# Patient Record
Sex: FEMALE | Race: WHITE
[De-identification: names, ages, dates, MRNs, and addresses within clinical notes are randomized per-mention and may not be internally consistent; named-entity substitution may affect disease eponyms.]

---

## 2017-08-29 ENCOUNTER — HOSPITAL ENCOUNTER (OUTPATIENT)
Dept: HOSPITAL 47 - RADMAMWWP | Age: 42
Discharge: HOME | End: 2017-08-29
Payer: COMMERCIAL

## 2017-08-29 DIAGNOSIS — Z12.31: Primary | ICD-10-CM

## 2017-08-29 PROCEDURE — 77063 BREAST TOMOSYNTHESIS BI: CPT

## 2017-08-31 NOTE — MM
Reason for exam: screening  (asymptomatic).

Last mammogram was performed 3 years and 1 month ago.



History:

Family history of breast cancer in grandmother and breast cancer in aunt.

Taking hormonal contraceptives for 18 years.



Physical Findings:

A clinical breast exam by your physician is recommended on an annual basis and 

results should be correlated with mammographic findings.



MG 3D Screening Mammo W/Cad

Bilateral CC and MLO view(s) were taken.

Prior study comparison: August 11, 2014, mammogram, performed at [facility].  July 29, 2013, mammogram, performed at [facility].

The breast tissue is heterogeneously dense. This may lower the sensitivity of 

mammography.  8mm focal asymmetry inferior central left breast 5-6cm from nipple.





ASSESSMENT: Incomplete: need additional imaging evaluation, BI-RAD 0



RECOMMENDATION:

Special view mammogram of the left breast.



If lesion persists on supplemental views, image directed ultrasound is 

recommended.



[facility] will attempt to contact patient to return for supplemental 

views and ultrasound if indicated.

## 2017-09-13 ENCOUNTER — HOSPITAL ENCOUNTER (OUTPATIENT)
Dept: HOSPITAL 47 - RADMAMWWP | Age: 42
Discharge: HOME | End: 2017-09-13
Payer: COMMERCIAL

## 2017-09-13 DIAGNOSIS — R92.8: Primary | ICD-10-CM

## 2017-09-13 DIAGNOSIS — Z80.3: ICD-10-CM

## 2017-09-13 NOTE — MM
Reason for exam: additional evaluation requested from abnormal screening.

Last mammogram was performed less than 1 month ago.



History:

Family history of breast cancer in grandmother and breast cancer in aunt.

Taking hormonal contraceptives for 18 years.



Physical Findings:

Nurse did not find any significant physical abnormalities on exam.



MG Work Up Mamm w CAD LT

Spot compression CC, spot compression MLO, ML, MLO, and CC view(s) were taken of 

the left breast.

Prior study comparison: August 29, 2017, bilateral MG 3d screening mammo w/cad.  

August 11, 2014, mammogram, performed at [facility].

Density in the left breast is less conspicious for which a 6 month follow up is 

recommended.



These results were verbally communicated with the patient and result sheet given 

to the patient on 9/13/17.





ASSESSMENT: Probably benign, BI-RAD 3



RECOMMENDATION:

Follow-up diagnostic mammogram of the left breast in 6 months.

## 2018-04-24 ENCOUNTER — HOSPITAL ENCOUNTER (OUTPATIENT)
Dept: HOSPITAL 47 - RADMAMWWP | Age: 43
Discharge: HOME | End: 2018-04-24
Payer: COMMERCIAL

## 2018-04-24 DIAGNOSIS — R92.8: Primary | ICD-10-CM

## 2018-04-24 PROCEDURE — 77065 DX MAMMO INCL CAD UNI: CPT

## 2018-04-24 NOTE — MM
Reason for exam: follow-up at short interval from prior study.

Last mammogram was performed 7 months ago.



History:

Family history of breast cancer in grandmother and breast cancer in aunt.

Taking hormonal contraceptives for 18 years.



Physical Findings:

Nurse did not find any significant physical abnormalities on exam.



MG 3D Diag Mammo W/Cad LT

CC, MLO, and XCCL view(s) were taken of the left breast.

Prior study comparison: September 13, 2017, left breast MG work up mamm w CAD LT. 

August 29, 2017, bilateral MG 3d screening mammo w/cad.

The breast tissue is heterogeneously dense. This may lower the sensitivity of 

mammography.  There is chronic nodularity in the left breast. There is no dominant

lesion.

No significant new findings when compared with previous films.



These results were verbally communicated with the patient and result sheet given 

to the patient on 4/24/18.





ASSESSMENT: Benign, BI-RAD 2



RECOMMENDATION:

Follow-up diagnostic mammogram of both breasts in 4 months.

Back on schedule for August 2018.

## 2018-06-20 ENCOUNTER — HOSPITAL ENCOUNTER (OUTPATIENT)
Dept: HOSPITAL 47 - LABWHC1 | Age: 43
Discharge: HOME | End: 2018-06-20
Payer: COMMERCIAL

## 2018-06-20 DIAGNOSIS — J30.2: ICD-10-CM

## 2018-06-20 DIAGNOSIS — Z00.00: Primary | ICD-10-CM

## 2018-06-20 DIAGNOSIS — E66.01: ICD-10-CM

## 2018-06-20 DIAGNOSIS — R23.2: ICD-10-CM

## 2018-06-20 LAB
ALBUMIN SERPL-MCNC: 4.1 G/DL (ref 3.5–5)
ALP SERPL-CCNC: 63 U/L (ref 38–126)
ALT SERPL-CCNC: 78 U/L (ref 9–52)
ANION GAP SERPL CALC-SCNC: 12 MMOL/L
AST SERPL-CCNC: 47 U/L (ref 14–36)
BUN SERPL-SCNC: 12 MG/DL (ref 7–17)
CALCIUM SPEC-MCNC: 9.2 MG/DL (ref 8.4–10.2)
CHLORIDE SERPL-SCNC: 105 MMOL/L (ref 98–107)
CHOLEST SERPL-MCNC: 244 MG/DL (ref ?–200)
CO2 SERPL-SCNC: 24 MMOL/L (ref 22–30)
ERYTHROCYTE [DISTWIDTH] IN BLOOD BY AUTOMATED COUNT: 5.21 M/UL (ref 3.8–5.4)
ERYTHROCYTE [DISTWIDTH] IN BLOOD: 13.2 % (ref 11.5–15.5)
GLUCOSE SERPL-MCNC: 93 MG/DL (ref 74–99)
HBA1C MFR BLD: 5.1 % (ref 4–6)
HCT VFR BLD AUTO: 45.3 % (ref 34–46)
HDLC SERPL-MCNC: 46 MG/DL (ref 40–60)
HGB BLD-MCNC: 14.9 GM/DL (ref 11.4–16)
LDLC SERPL CALC-MCNC: 166 MG/DL (ref 0–99)
MCH RBC QN AUTO: 28.5 PG (ref 25–35)
MCHC RBC AUTO-ENTMCNC: 32.8 G/DL (ref 31–37)
MCV RBC AUTO: 87 FL (ref 80–100)
PLATELET # BLD AUTO: 329 K/UL (ref 150–450)
POTASSIUM SERPL-SCNC: 4 MMOL/L (ref 3.5–5.1)
PROT SERPL-MCNC: 7.2 G/DL (ref 6.3–8.2)
SODIUM SERPL-SCNC: 141 MMOL/L (ref 137–145)
T4 FREE SERPL-MCNC: 1.07 NG/DL (ref 0.78–2.19)
TRIGL SERPL-MCNC: 160 MG/DL (ref ?–150)
WBC # BLD AUTO: 6.8 K/UL (ref 3.8–10.6)

## 2018-06-20 PROCEDURE — 84443 ASSAY THYROID STIM HORMONE: CPT

## 2018-06-20 PROCEDURE — 84144 ASSAY OF PROGESTERONE: CPT

## 2018-06-20 PROCEDURE — 84403 ASSAY OF TOTAL TESTOSTERONE: CPT

## 2018-06-20 PROCEDURE — 71046 X-RAY EXAM CHEST 2 VIEWS: CPT

## 2018-06-20 PROCEDURE — 82672 ASSAY OF ESTROGEN: CPT

## 2018-06-20 PROCEDURE — 80061 LIPID PANEL: CPT

## 2018-06-20 PROCEDURE — 84439 ASSAY OF FREE THYROXINE: CPT

## 2018-06-20 PROCEDURE — 36415 COLL VENOUS BLD VENIPUNCTURE: CPT

## 2018-06-20 PROCEDURE — 83036 HEMOGLOBIN GLYCOSYLATED A1C: CPT

## 2018-06-20 PROCEDURE — 83002 ASSAY OF GONADOTROPIN (LH): CPT

## 2018-06-20 PROCEDURE — 80053 COMPREHEN METABOLIC PANEL: CPT

## 2018-06-20 PROCEDURE — 85027 COMPLETE CBC AUTOMATED: CPT

## 2018-06-20 PROCEDURE — 82157 ASSAY OF ANDROSTENEDIONE: CPT

## 2018-06-20 PROCEDURE — 83001 ASSAY OF GONADOTROPIN (FSH): CPT

## 2018-06-20 NOTE — XR
EXAMINATION TYPE: XR chest 2V

 

DATE OF EXAM: 6/20/2018

 

COMPARISON: 12/28/2015

 

TECHNIQUE: PA and lateral views submitted.

 

HISTORY: Shortness of breath

 

FINDINGS:

The lungs are clear and  there is no pneumothorax, pleural effusion, or focal pneumonia.  Biapical pl
eural thickening. No overt failure.

 

IMPRESSION: 

1. No acute process.

## 2018-11-21 ENCOUNTER — HOSPITAL ENCOUNTER (OUTPATIENT)
Dept: HOSPITAL 47 - RADMAMWWP | Age: 43
Discharge: HOME | End: 2018-11-21
Attending: OBSTETRICS & GYNECOLOGY
Payer: COMMERCIAL

## 2018-11-21 DIAGNOSIS — R92.8: Primary | ICD-10-CM

## 2018-11-21 PROCEDURE — 77062 BREAST TOMOSYNTHESIS BI: CPT

## 2018-11-21 PROCEDURE — 77066 DX MAMMO INCL CAD BI: CPT

## 2018-11-21 NOTE — MM
Reason for exam: additional evaluation requested from prior study.

Last mammogram was performed 7 months ago.



History:

Family history of breast cancer in grandmother and breast cancer in aunt.

Taking hormonal contraceptives for 18 years.



Physical Findings:

Nurse did not find any significant physical abnormalities on exam.



MG 3D Diag Mammo W/Cad [facility]

Bilateral CC and MLO view(s) were taken.

Prior study comparison: April 24, 2018, left breast MG 3d diag mammo w/cad LT.  

September 13, 2017, left breast MG work up mamm w CAD LT.

The breast tissue is heterogeneously dense. This may lower the sensitivity of 

mammography.  Inferior central and posterior asymmetric density is unchanged for 1

year.



These results were verbally communicated with the patient and result sheet given 

to the patient on 11/21/18.





ASSESSMENT: Incomplete: need additional imaging evaluation, BI-RAD 0



RECOMMENDATION:

Ultrasound of the left breast.

(inferior half)

## 2018-11-21 NOTE — USB
Reason for exam: additional evaluation requested from abnormal screening.



History:

Family history of breast cancer in grandmother and breast cancer in aunt.

Taking hormonal contraceptives for 18 years.



US Breast Limited LT

Left limited breast ultrasound including focal area of concern, retroareolar and 

axilla demonstrates a 0.6 x 0.2 x 0.5cm mixed lesion at 5 o'clock.



These results were verbally communicated with the patient and result sheet given 

to the patient on 11/21/18.





ASSESSMENT: Probably benign, BI-RAD 3



RECOMMENDATION:

Follow-up diagnostic mammogram and ultrasound of the left breast in 6 months.

## 2019-01-07 ENCOUNTER — HOSPITAL ENCOUNTER (OUTPATIENT)
Dept: HOSPITAL 47 - RADECHMAIN | Age: 44
Discharge: HOME | End: 2019-01-07
Attending: FAMILY MEDICINE
Payer: COMMERCIAL

## 2019-01-07 DIAGNOSIS — R10.11: ICD-10-CM

## 2019-01-07 DIAGNOSIS — I07.1: Primary | ICD-10-CM

## 2019-01-07 PROCEDURE — 76705 ECHO EXAM OF ABDOMEN: CPT

## 2019-01-07 PROCEDURE — 93306 TTE W/DOPPLER COMPLETE: CPT

## 2019-01-07 NOTE — US
EXAMINATION TYPE: US gallbladder

 

DATE OF EXAM: 1/7/2019

 

COMPARISON: CT 2015

 

CLINICAL HISTORY: R19.07 Abd mass, C34.90 Lung Ca. Chest and right shoulder pain x 1 month

 

EXAM MEASUREMENTS:

 

Liver Length:  15.6 cm   

Gallbladder Wall:  0.2 cm   

CBD:  0.4 cm

Right Kidney:  9.9 x 4.5 x 4.1 cm

 

 

 

Pancreas:  obscured by overlying midline bowel gas

Liver:  wnl  

Gallbladder:  wnl

**Evidence for sonographic Sanchez's sign:  no

CBD:  wnl 

Right Kidney:  wnl 

 

There is no ascites.

 

IMPRESSION: Abdominal mass is not evident, CT scan may be of increased sensitivity. Exam is limited.

## 2019-01-08 NOTE — ECHOF
Referral Reason:R07.9 Chest pain



MEASUREMENTS

--------

HEIGHT: 162.6 cm

WEIGHT: 72.6 kg

BP: 116/68

RVIDd:   2.7 cm     (< 3.3)

IVSd:   1.0 cm     (0.6 - 1.1)

LVIDd:   4.3 cm     (3.9 - 5.3)

LVPWd:   0.8 cm     (0.6 - 1.1)

IVSs:   1.3 cm

LVIDs:   2.9 cm

LVPWs:   1.2 cm

LA Diam:   3.0 cm     (2.7 - 3.8)

LAESV Index (A-L):   21.66 ml/m

Ao Diam:   2.9 cm     (2.0 - 3.7)

AV Cusp:   2.1 cm     (1.5 - 2.6)

MV EXCURSION:   13.536 mm     (> 18.000)

MV EF SLOPE:   120 mm/s     (70 - 150)

EPSS:   0.9 cm

MV E Lam:   0.92 m/s

MV DecT:   133 ms

MV A Lam:   0.57 m/s

MV E/A Ratio:   1.63 

RAP:   5.00 mmHg

RVSP:   19.52 mmHg







FINDINGS

--------

Sinus rhythm.

This was a technically excellent study.

The left ventricular size is normal.   Left ventricular wall thickness is normal.   Overall left vent
ricular systolic function is normal with, an EF between 60 - 65 %.

The right ventricle is normal in size.

Normal LA  size by volume 22+/-6 ml/m2.

The right atrium is normal in size.

The aortic valve is trileaflet and appears structurally normal.

The mitral valve is normal.

Mild tricuspid regurgitation present.   Right ventricular systolic pressure is normal at < 35 mmHg.

There is no pulmonic regurgitation present.

The aortic root size is normal.

Normal inferior vena cava with normal inspiratory collapse consistent with estimated right atrial pre
ssure of  5 mmHg.

There is no pericardial effusion.



CONCLUSIONS

--------

1. Sinus rhythm.

2. This was a technically excellent study.

3. The left ventricular size is normal.

4. Left ventricular wall thickness is normal.

5. Overall left ventricular systolic function is normal with, an EF between 60 - 65 %.

6. The right ventricle is normal in size.

7. Normal LA size by volume 22+/-6 ml/m2.

8. The right atrium is normal in size.

9. The aortic valve is trileaflet and appears structurally normal.

10. The mitral valve is normal.

11. Mild tricuspid regurgitation present.

12. Right ventricular systolic pressure is normal at < 35 mmHg.

13. There is no pulmonic regurgitation present.

14. The aortic root size is normal.

15. Normal inferior vena cava with normal inspiratory collapse consistent with estimated right atrial
 pressure of  5 mmHg.

16. There is no pericardial effusion.





SONOGRAPHER: Courtney Srivastava RDCS

## 2019-08-23 ENCOUNTER — HOSPITAL ENCOUNTER (OUTPATIENT)
Dept: HOSPITAL 47 - RADMAMWWP | Age: 44
Discharge: HOME | End: 2019-08-23
Attending: OBSTETRICS & GYNECOLOGY
Payer: COMMERCIAL

## 2019-08-23 DIAGNOSIS — R92.8: Primary | ICD-10-CM

## 2019-08-23 PROCEDURE — 77061 BREAST TOMOSYNTHESIS UNI: CPT

## 2019-08-23 PROCEDURE — 77065 DX MAMMO INCL CAD UNI: CPT

## 2019-08-26 NOTE — MM
Reason for exam: follow-up at short interval from prior study.

Last mammogram was performed 9 months ago.



History:

Family history of breast cancer in paternal grandmother at age 50 and breast 

cancer in maternal aunt at age 50.

Taking hormonal contraceptives for 19 years.



Physical Findings:

Nurse did not find any significant physical abnormalities on exam.



MG 3D Diag Mammo W/Cad LT

CC and MLO view(s) were taken of the left breast.

Prior study comparison: November 21, 2018, bilateral MG 3d diag mammo w/cad [facility].  

April 24, 2018, left breast MG 3d diag mammo w/cad LT.

The breast tissue is heterogeneously dense. This may lower the sensitivity of 

mammography.  There is no discrete abnormality.

No significant new findings when compared with previous films.



These results were verbally communicated with the patient and result sheet given 

to the patient on 8/23/19.





ASSESSMENT: Benign, BI-RAD 2



RECOMMENDATION:

Routine screening mammogram of both breasts in 3 months.

Back on schedule for November 2019.

## 2019-08-26 NOTE — USB
Reason for exam: follow-up at short interval from prior study.



History:

Family history of breast cancer in paternal grandmother at age 50 and breast 

cancer in maternal aunt at age 50.

Taking hormonal contraceptives for 19 years.



US Breast Limited LT

Left limited breast ultrasound including focal area of concern, retroareolar and 

axilla demonstrates a 8 x 3 x 5mm oval, hypoechoic, stable lesion at 5 o'clock.



These results were verbally communicated with the patient and result sheet given 

to the patient on 8/23/19.





ASSESSMENT: Benign, BI-RAD 2



RECOMMENDATION:

Routine screening mammogram of both breasts in 3 months.

Back on schedule for November 2019.

## 2019-09-16 ENCOUNTER — HOSPITAL ENCOUNTER (EMERGENCY)
Dept: HOSPITAL 47 - EC | Age: 44
Discharge: HOME | End: 2019-09-16
Payer: COMMERCIAL

## 2019-09-16 VITALS — TEMPERATURE: 98 F

## 2019-09-16 VITALS — SYSTOLIC BLOOD PRESSURE: 133 MMHG | DIASTOLIC BLOOD PRESSURE: 71 MMHG | RESPIRATION RATE: 18 BRPM | HEART RATE: 77 BPM

## 2019-09-16 DIAGNOSIS — Z79.3: ICD-10-CM

## 2019-09-16 DIAGNOSIS — Z79.899: ICD-10-CM

## 2019-09-16 DIAGNOSIS — Z91.018: ICD-10-CM

## 2019-09-16 DIAGNOSIS — N12: Primary | ICD-10-CM

## 2019-09-16 DIAGNOSIS — Z88.1: ICD-10-CM

## 2019-09-16 DIAGNOSIS — Z91.048: ICD-10-CM

## 2019-09-16 LAB
ALBUMIN SERPL-MCNC: 4.1 G/DL (ref 3.5–5)
ALP SERPL-CCNC: 74 U/L (ref 38–126)
ALT SERPL-CCNC: 40 U/L (ref 9–52)
ANION GAP SERPL CALC-SCNC: 10 MMOL/L
AST SERPL-CCNC: 26 U/L (ref 14–36)
BUN SERPL-SCNC: 11 MG/DL (ref 7–17)
CALCIUM SPEC-MCNC: 9 MG/DL (ref 8.4–10.2)
CELLS COUNTED: 100
CHLORIDE SERPL-SCNC: 102 MMOL/L (ref 98–107)
CO2 SERPL-SCNC: 25 MMOL/L (ref 22–30)
EOSINOPHIL # BLD MANUAL: 0.09 K/UL (ref 0–0.7)
ERYTHROCYTE [DISTWIDTH] IN BLOOD BY AUTOMATED COUNT: 5.07 M/UL (ref 3.8–5.4)
ERYTHROCYTE [DISTWIDTH] IN BLOOD: 15.5 % (ref 11.5–15.5)
GLUCOSE SERPL-MCNC: 138 MG/DL (ref 74–99)
HCT VFR BLD AUTO: 44.5 % (ref 34–46)
HGB BLD-MCNC: 15 GM/DL (ref 11.4–16)
LYMPHOCYTES # BLD MANUAL: 2.49 K/UL (ref 1–4.8)
MCH RBC QN AUTO: 29.6 PG (ref 25–35)
MCHC RBC AUTO-ENTMCNC: 33.8 G/DL (ref 31–37)
MCV RBC AUTO: 87.6 FL (ref 80–100)
MONOCYTES # BLD MANUAL: 0.43 K/UL (ref 0–1)
NEUTROPHILS NFR BLD MANUAL: 65 %
PH UR: 5.5 [PH] (ref 5–8)
PLATELET # BLD AUTO: 341 K/UL (ref 150–450)
POTASSIUM SERPL-SCNC: 3.9 MMOL/L (ref 3.5–5.1)
PROT SERPL-MCNC: 7.3 G/DL (ref 6.3–8.2)
PROT UR QL: (no result)
RBC UR QL: 78 /HPF (ref 0–5)
SODIUM SERPL-SCNC: 137 MMOL/L (ref 137–145)
SP GR UR: 1.02 (ref 1–1.03)
SQUAMOUS UR QL AUTO: 27 /HPF (ref 0–4)
UROBILINOGEN UR QL STRIP: <2 MG/DL (ref ?–2)
WBC # BLD AUTO: 8.6 K/UL (ref 3.8–10.6)

## 2019-09-16 PROCEDURE — 96374 THER/PROPH/DIAG INJ IV PUSH: CPT

## 2019-09-16 PROCEDURE — 87086 URINE CULTURE/COLONY COUNT: CPT

## 2019-09-16 PROCEDURE — 81025 URINE PREGNANCY TEST: CPT

## 2019-09-16 PROCEDURE — 99283 EMERGENCY DEPT VISIT LOW MDM: CPT

## 2019-09-16 PROCEDURE — 96375 TX/PRO/DX INJ NEW DRUG ADDON: CPT

## 2019-09-16 PROCEDURE — 85025 COMPLETE CBC W/AUTO DIFF WBC: CPT

## 2019-09-16 PROCEDURE — 36415 COLL VENOUS BLD VENIPUNCTURE: CPT

## 2019-09-16 PROCEDURE — 80053 COMPREHEN METABOLIC PANEL: CPT

## 2019-09-16 PROCEDURE — 81001 URINALYSIS AUTO W/SCOPE: CPT

## 2019-09-16 NOTE — ED
Back Pain HPI





- General


Chief Complaint: Back Pain/Injury


Stated Complaint: Lower Back and Groin Pain


Time Seen by Provider: 09/16/19 02:47


Source: patient


Limitations: no limitations





- History of Present Illness


Initial Comments: 





Patient is a 43-year-old female presenting to emergency Department with a chief 

complaint of kidney and groin pain.  Patient reports she developed a left flank 

pain Yesterday along with left groin pain that is exacerbated with ambulation.  

Patient reports the flank pain is exacerbated with urination.  Patient denies 

increased urgency or frequency or dysuria.  Patient denies any other abdominal 

pain.  Patient denies hematuria, hematochezia or melena.  Patient does report 

chills but no fevers.  Patient has no history of kidney stones.  Patient denies 

any vaginal bleeding or discharge.  Patient denies previous abdominal surgeries.





- Related Data


                                Home Medications











 Medication  Instructions  Recorded  Confirmed


 


Cetirizine HCl [Zyrtec] 10 mg PO DAILY 04/25/15 12/28/15


 


Seasoniq Lo 1 tab PO DAILY 04/25/15 12/28/15


 


diphenhydrAMINE [Benadryl] 25 mg PO BID PRN 04/25/15 12/28/15








                                  Previous Rx's











 Medication  Instructions  Recorded


 


Levofloxacin [Levaquin] 750 mg PO DAILY #7 tab 12/28/15


 


Ondansetron Odt [Zofran ODT] 4 mg PO Q8HR PRN #10 tab 12/28/15


 


Nitrofurantoin Monohyd/M-Cryst 100 mg PO Q12HR #14 cap 09/16/19





[Macrobid]  











                                    Allergies











Allergy/AdvReac Type Severity Reaction Status Date / Time


 


cephalexin monohydrate Allergy  Unknown Verified 12/28/15 01:27





[From Keflex]     


 


clindamycin Allergy  Unknown Verified 09/16/19 02:45


 


multiple food allergies Allergy  Anaphylaxis Uncoded 09/16/19 02:45


 


seasonal allergies Allergy  Unknown Uncoded 09/16/19 02:45














Review of Systems


ROS Statement: 


Those systems with pertinent positive or pertinent negative responses have been 

documented in the HPI.





ROS Other: All systems not noted in ROS Statement are negative.





Past Medical History


Past Medical History: No Reported History


History of Any Multi-Drug Resistant Organisms: None Reported, C-DIFF


Date of last positivie culture/infection: 2015


Past Surgical History: No Surgical Hx Reported


Past Psychological History: Anxiety


Smoking Status: Never smoker


Past Alcohol Use History: Occasional


Past Drug Use History: None Reported





General Exam


Limitations: no limitations


General appearance: alert, in no apparent distress


Head exam: Present: atraumatic, normocephalic, normal inspection


Eye exam: Present: normal appearance, PERRL, EOMI


Pupils: Present: normal accommodation


ENT exam: Present: normal exam, mucous membranes moist, normal external ear exam


Neck exam: Present: normal inspection, full ROM.  Absent: tenderness


Respiratory exam: Present: normal lung sounds bilaterally


Cardiovascular Exam: Present: regular rate, normal rhythm, normal heart sounds


GI/Abdominal exam: Present: soft, tenderness (Left groin and suprapubic 

tenderness on palpation.  Left flank pain.), normal bowel sounds.  Absent: 

distended, guarding, rebound, other (Negative Sanchez, negative McBurney point 

tenderness, negative Rovsing, negative obturator.)


Extremities exam: Present: normal inspection, full ROM


Back exam: Present: normal inspection, full ROM, CVA tenderness (L)


Neurological exam: Present: alert, oriented X3


Psychiatric exam: Present: normal affect, normal mood


Skin exam: Present: warm, intact, normal color





Course


                                   Vital Signs











  09/16/19 09/16/19





  02:43 03:45


 


Temperature 98.1 F 98 F


 


Pulse Rate 111 H 69


 


Respiratory 20 16





Rate  


 


Blood Pressure 124/88 105/72


 


O2 Sat by Pulse 95 97





Oximetry  














Medical Decision Making





- Medical Decision Making





patient is a 43-year-old female presenting to the emergency department with a 

chief complaint of flank pain.  Patient reports left flank pain and groin pain 

has started today.  Patient also has left flank pain with urination.  Otherwise 

no increased urgency or frequency or dysuria.  The ground pain appears to be 

musculoskeletal in nature as it is exacerbated with hip extension.  No hernia 

noted on physical examination.  UA is indicative of elevation in white blood 

cells and leukocyte esterase.  Patient is concerned for susceptibility to C. 

diff.  She will be treated with Macrobid.  Patient given a dose in the ED.  

Patient will be discharged with Zofran and Macrobid.  Patient is nontoxic at the

moment.  Vitals are stable.  Strict return parameters were thoroughly discussed 

patient was understanding and agreeable.  No need for further imaging required. 

Case discussed with physician.





- Lab Data


Result diagrams: 


                                 09/16/19 03:12





                                 09/16/19 03:12


                                   Lab Results











  09/16/19 09/16/19 09/16/19 Range/Units





  02:47 02:47 03:12 


 


WBC    8.6  (3.8-10.6)  k/uL


 


RBC    5.07  (3.80-5.40)  m/uL


 


Hgb    15.0  (11.4-16.0)  gm/dL


 


Hct    44.5  (34.0-46.0)  %


 


MCV    87.6  (80.0-100.0)  fL


 


MCH    29.6  (25.0-35.0)  pg


 


MCHC    33.8  (31.0-37.0)  g/dL


 


RDW    15.5  (11.5-15.5)  %


 


Plt Count    341  (150-450)  k/uL


 


Neutrophils % (Manual)    65  %


 


Lymphocytes % (Manual)    29  %


 


Monocytes % (Manual)    5  %


 


Eosinophils % (Manual)    1  %


 


Neutrophils # (Manual)    5.59  (1.3-7.7)  k/uL


 


Lymphocytes # (Manual)    2.49  (1.0-4.8)  k/uL


 


Monocytes # (Manual)    0.43  (0-1.0)  k/uL


 


Eosinophils # (Manual)    0.09  (0-0.7)  k/uL


 


Nucleated RBCs    0  (0-0)  /100 WBC


 


Manual Slide Review    Performed  


 


Sodium     (137-145)  mmol/L


 


Potassium     (3.5-5.1)  mmol/L


 


Chloride     ()  mmol/L


 


Carbon Dioxide     (22-30)  mmol/L


 


Anion Gap     mmol/L


 


BUN     (7-17)  mg/dL


 


Creatinine     (0.52-1.04)  mg/dL


 


Est GFR (CKD-EPI)AfAm     (>60 ml/min/1.73 sqM)  


 


Est GFR (CKD-EPI)NonAf     (>60 ml/min/1.73 sqM)  


 


Glucose     (74-99)  mg/dL


 


Calcium     (8.4-10.2)  mg/dL


 


Total Bilirubin     (0.2-1.3)  mg/dL


 


AST     (14-36)  U/L


 


ALT     (9-52)  U/L


 


Alkaline Phosphatase     ()  U/L


 


Total Protein     (6.3-8.2)  g/dL


 


Albumin     (3.5-5.0)  g/dL


 


Urine Color  Yellow    


 


Urine Appearance  Turbid H    (Clear)  


 


Urine pH  5.5    (5.0-8.0)  


 


Ur Specific Gravity  1.024    (1.001-1.035)  


 


Urine Protein  1+ H    (Negative)  


 


Urine Glucose (UA)  Negative    (Negative)  


 


Urine Ketones  Negative    (Negative)  


 


Urine Blood  Trace H    (Negative)  


 


Urine Nitrite  Negative    (Negative)  


 


Urine Bilirubin  Negative    (Negative)  


 


Urine Urobilinogen  <2.0    (<2.0)  mg/dL


 


Ur Leukocyte Esterase  Large H    (Negative)  


 


Urine RBC  78 H    (0-5)  /hpf


 


Urine WBC  >182 H    (0-5)  /hpf


 


Urine WBC Clumps  Moderate H    (None)  /hpf


 


Ur Squamous Epith Cells  27 H    (0-4)  /hpf


 


Urine Bacteria  Moderate H    (None)  /hpf


 


Urine Mucus  Many H    (None)  /hpf


 


Urine HCG, Qual   Not Detected   (Not Detectd)  














  09/16/19 Range/Units





  03:12 


 


WBC   (3.8-10.6)  k/uL


 


RBC   (3.80-5.40)  m/uL


 


Hgb   (11.4-16.0)  gm/dL


 


Hct   (34.0-46.0)  %


 


MCV   (80.0-100.0)  fL


 


MCH   (25.0-35.0)  pg


 


MCHC   (31.0-37.0)  g/dL


 


RDW   (11.5-15.5)  %


 


Plt Count   (150-450)  k/uL


 


Neutrophils % (Manual)   %


 


Lymphocytes % (Manual)   %


 


Monocytes % (Manual)   %


 


Eosinophils % (Manual)   %


 


Neutrophils # (Manual)   (1.3-7.7)  k/uL


 


Lymphocytes # (Manual)   (1.0-4.8)  k/uL


 


Monocytes # (Manual)   (0-1.0)  k/uL


 


Eosinophils # (Manual)   (0-0.7)  k/uL


 


Nucleated RBCs   (0-0)  /100 WBC


 


Manual Slide Review   


 


Sodium  137  (137-145)  mmol/L


 


Potassium  3.9  (3.5-5.1)  mmol/L


 


Chloride  102  ()  mmol/L


 


Carbon Dioxide  25  (22-30)  mmol/L


 


Anion Gap  10  mmol/L


 


BUN  11  (7-17)  mg/dL


 


Creatinine  0.96  (0.52-1.04)  mg/dL


 


Est GFR (CKD-EPI)AfAm  84  (>60 ml/min/1.73 sqM)  


 


Est GFR (CKD-EPI)NonAf  73  (>60 ml/min/1.73 sqM)  


 


Glucose  138 H  (74-99)  mg/dL


 


Calcium  9.0  (8.4-10.2)  mg/dL


 


Total Bilirubin  0.7  (0.2-1.3)  mg/dL


 


AST  26  (14-36)  U/L


 


ALT  40  (9-52)  U/L


 


Alkaline Phosphatase  74  ()  U/L


 


Total Protein  7.3  (6.3-8.2)  g/dL


 


Albumin  4.1  (3.5-5.0)  g/dL


 


Urine Color   


 


Urine Appearance   (Clear)  


 


Urine pH   (5.0-8.0)  


 


Ur Specific Gravity   (1.001-1.035)  


 


Urine Protein   (Negative)  


 


Urine Glucose (UA)   (Negative)  


 


Urine Ketones   (Negative)  


 


Urine Blood   (Negative)  


 


Urine Nitrite   (Negative)  


 


Urine Bilirubin   (Negative)  


 


Urine Urobilinogen   (<2.0)  mg/dL


 


Ur Leukocyte Esterase   (Negative)  


 


Urine RBC   (0-5)  /hpf


 


Urine WBC   (0-5)  /hpf


 


Urine WBC Clumps   (None)  /hpf


 


Ur Squamous Epith Cells   (0-4)  /hpf


 


Urine Bacteria   (None)  /hpf


 


Urine Mucus   (None)  /hpf


 


Urine HCG, Qual   (Not Detectd)  














Disposition


Clinical Impression: 


 Pyelonephritis





Disposition: HOME SELF-CARE


Condition: Stable


Instructions (If sedation given, give patient instructions):  Kidney Infection 

(ED)


Additional Instructions: 


Please take prescribed medication as directed.  Please follow up with primary 

care.  Alternate between Tylenol and ibuprofen for pain control.  Please return 

to emergency department if symptoms worsen.


Prescriptions: 


Nitrofurantoin Monohyd/M-Cryst [Macrobid] 100 mg PO Q12HR #14 cap


Is patient prescribed a controlled substance at d/c from ED?: No


Referrals: 


Mario Juárez DO [Primary Care Provider] - 1-2 days


Time of Disposition: 04:29

## 2020-06-18 ENCOUNTER — HOSPITAL ENCOUNTER (OUTPATIENT)
Dept: HOSPITAL 47 - RADMAMWWP | Age: 45
Discharge: HOME | End: 2020-06-18
Attending: OBSTETRICS & GYNECOLOGY
Payer: COMMERCIAL

## 2020-06-18 DIAGNOSIS — Z12.31: Primary | ICD-10-CM

## 2020-06-18 PROCEDURE — 77067 SCR MAMMO BI INCL CAD: CPT

## 2020-06-18 PROCEDURE — 77063 BREAST TOMOSYNTHESIS BI: CPT

## 2020-06-22 NOTE — MM
Reason for exam: screening  (asymptomatic).

Last mammogram was performed 10 months ago.



History:

Family history of breast cancer in paternal grandmother at age 50 and breast 

cancer in maternal aunt at age 50.

Taking hormonal contraceptives for 19 years.



Physical Findings:

A clinical breast exam by your physician is recommended on an annual basis and 

results should be correlated with mammographic findings.



MG 3D Screening Mammo W/Cad

Bilateral CC and MLO view(s) were taken.

Prior study comparison: August 23, 2019, left breast MG 3d diag mammo w/cad LT.  

November 21, 2018, bilateral MG 3d diag mammo w/cad [facility].

The breast tissue is heterogeneously dense. This may lower the sensitivity of 

mammography.  There is no discrete abnormality.





ASSESSMENT: Negative, BI-RAD 1



RECOMMENDATION:

Routine screening mammogram of both breasts in 1 year.

## 2022-02-09 ENCOUNTER — HOSPITAL ENCOUNTER (OUTPATIENT)
Dept: HOSPITAL 47 - RADMAMWWP | Age: 47
Discharge: HOME | End: 2022-02-09
Attending: OBSTETRICS & GYNECOLOGY
Payer: COMMERCIAL

## 2022-02-09 DIAGNOSIS — Z80.3: ICD-10-CM

## 2022-02-09 DIAGNOSIS — Z12.31: Primary | ICD-10-CM

## 2022-02-09 PROCEDURE — 77067 SCR MAMMO BI INCL CAD: CPT

## 2022-02-09 PROCEDURE — 77063 BREAST TOMOSYNTHESIS BI: CPT

## 2022-02-11 NOTE — MM
Reason for exam: screening  (asymptomatic).

Last mammogram was performed 1 year and 8 months ago.



History:

Family history of breast cancer in paternal grandmother at age 50 and breast 

cancer in maternal aunt at age 50.

Taking hormonal contraceptives for 19 years.



Physical Findings:

A clinical breast exam by your physician is recommended on an annual basis and 

results should be correlated with mammographic findings.



MG 3D Screening Mammo W/Cad

Bilateral CC and MLO view(s) were taken.

Prior study comparison: June 18, 2020, bilateral MG 3d screening mammo w/cad.  

August 23, 2019, left breast MG 3d diag mammo w/cad LT.

The breast tissue is heterogeneously dense. This may lower the sensitivity of 

mammography.  Asymmetry inferior left MLO is unchanged.  No significant changes 

when compared with prior studies.





ASSESSMENT: Benign, BI-RAD 2



RECOMMENDATION:

Routine screening mammogram of both breasts in 1 year.

## 2022-09-12 ENCOUNTER — HOSPITAL ENCOUNTER (OUTPATIENT)
Dept: HOSPITAL 47 - LABWHC1 | Age: 47
Discharge: HOME | End: 2022-09-12
Attending: FAMILY MEDICINE
Payer: COMMERCIAL

## 2022-09-12 DIAGNOSIS — Z00.00: Primary | ICD-10-CM

## 2022-09-12 DIAGNOSIS — M79.10: ICD-10-CM

## 2022-09-12 DIAGNOSIS — E55.9: ICD-10-CM

## 2022-09-12 LAB
ALBUMIN SERPL-MCNC: 4.2 G/DL (ref 3.8–4.9)
ALBUMIN/GLOB SERPL: 1.56 G/DL (ref 1.6–3.17)
ALP SERPL-CCNC: 62 U/L (ref 41–126)
ALT SERPL-CCNC: 33 U/L (ref 8–44)
ANION GAP SERPL CALC-SCNC: 11.2 MMOL/L (ref 10–18)
AST SERPL-CCNC: 23 U/L (ref 13–35)
BUN SERPL-SCNC: 15.2 MG/DL (ref 9–27)
BUN/CREAT SERPL: 15.2 RATIO (ref 12–20)
CALCIUM SPEC-MCNC: 8.9 MG/DL (ref 8.7–10.3)
CHLORIDE SERPL-SCNC: 103 MMOL/L (ref 96–109)
CHOLEST SERPL-MCNC: 221 MG/DL (ref 0–200)
CO2 SERPL-SCNC: 22.8 MMOL/L (ref 20–27.5)
DSDNA AB SER QL: NEGATIVE
DSDNA AB TITR SER: 1 IU/ML
ERYTHROCYTE [DISTWIDTH] IN BLOOD BY AUTOMATED COUNT: 5.01 X 10*6/UL (ref 4.1–5.2)
ERYTHROCYTE [DISTWIDTH] IN BLOOD: 12.8 % (ref 11.5–14.5)
GLOBULIN SER CALC-MCNC: 2.7 G/DL (ref 1.6–3.3)
GLUCOSE SERPL-MCNC: 92 MG/DL (ref 70–110)
HCT VFR BLD AUTO: 44.8 % (ref 37.2–46.3)
HDLC SERPL-MCNC: 38.3 MG/DL (ref 40–60)
HGB BLD-MCNC: 14.4 G/DL (ref 12–15)
LDLC SERPL CALC-MCNC: 155.9 MG/DL (ref 0–131)
MCH RBC QN AUTO: 28.7 PG (ref 27–32)
MCHC RBC AUTO-ENTMCNC: 32.1 G/DL (ref 32–37)
MCV RBC AUTO: 89.4 FL (ref 80–97)
NRBC BLD AUTO-RTO: 0 /100 WBCS (ref 0–0)
PLATELET # BLD AUTO: 348 X 10*3/UL (ref 140–440)
POTASSIUM SERPL-SCNC: 4.4 MMOL/L (ref 3.5–5.5)
PROT SERPL-MCNC: 6.9 G/DL (ref 6.2–8.2)
RHEUMATOID FACT SERPL-ACNC: <10 IU/ML (ref 0–15)
SODIUM SERPL-SCNC: 137 MMOL/L (ref 135–145)
T4 FREE SERPL-MCNC: 1.17 NG/DL (ref 0.8–1.8)
TRIGL SERPL-MCNC: 134 MG/DL (ref 0–149)
VLDLC SERPL CALC-MCNC: 26.8 MG/DL (ref 5–40)
WBC # BLD AUTO: 8.5 X 10*3/UL (ref 4.5–10)

## 2022-09-12 PROCEDURE — 86431 RHEUMATOID FACTOR QUANT: CPT

## 2022-09-12 PROCEDURE — 85027 COMPLETE CBC AUTOMATED: CPT

## 2022-09-12 PROCEDURE — 84443 ASSAY THYROID STIM HORMONE: CPT

## 2022-09-12 PROCEDURE — 83036 HEMOGLOBIN GLYCOSYLATED A1C: CPT

## 2022-09-12 PROCEDURE — 86812 HLA TYPING A B OR C: CPT

## 2022-09-12 PROCEDURE — 84439 ASSAY OF FREE THYROXINE: CPT

## 2022-09-12 PROCEDURE — 86038 ANTINUCLEAR ANTIBODIES: CPT

## 2022-09-12 PROCEDURE — 82306 VITAMIN D 25 HYDROXY: CPT

## 2022-09-12 PROCEDURE — 36415 COLL VENOUS BLD VENIPUNCTURE: CPT

## 2022-09-12 PROCEDURE — 80061 LIPID PANEL: CPT

## 2022-09-12 PROCEDURE — 80053 COMPREHEN METABOLIC PANEL: CPT

## 2022-09-12 PROCEDURE — 86225 DNA ANTIBODY NATIVE: CPT

## 2022-09-12 PROCEDURE — 85652 RBC SED RATE AUTOMATED: CPT

## 2022-09-13 LAB — HLA-B27 QL: NEGATIVE

## 2023-04-03 ENCOUNTER — HOSPITAL ENCOUNTER (OUTPATIENT)
Dept: HOSPITAL 47 - RADMAMWWP | Age: 48
Discharge: HOME | End: 2023-04-03
Attending: OBSTETRICS & GYNECOLOGY
Payer: COMMERCIAL

## 2023-04-03 DIAGNOSIS — Z12.31: Primary | ICD-10-CM

## 2023-04-03 DIAGNOSIS — Z80.3: ICD-10-CM

## 2023-04-03 PROCEDURE — 77063 BREAST TOMOSYNTHESIS BI: CPT

## 2023-04-03 PROCEDURE — 77067 SCR MAMMO BI INCL CAD: CPT

## 2023-04-04 NOTE — MM
Reason for Exam: Screening  (asymptomatic). 

Last mammogram was performed 1 year(s) and 2 month(s) ago. 





Patient History: 

Menarche at age 13. First Full-Term Pregnancy at age 20. Currently using Hormonal Contraceptives,

for 19 years.

Paternal grandmother had breast cancer, age 50. Maternal aunt had breast cancer, age 50. Maternal

grandmother had breast cancer.

Last menstrual period: 03/20/2023





Risk Values: 

Rosina 5 year model risk: 0.6%.

NCI Lifetime model risk: 6.9%.





Prior Study Comparison: 

8/23/2019 Left Diagnostic Mammogram, [facility]. 6/18/2020 Bilateral Screening Mammogram, [facility]. 2/9/2022

Bilateral Screening Mammogram, [facility]. 





Tissue Density: 

The breast tissue is heterogeneously dense. This may lower the sensitivity of mammography.





Findings: 

Analyzed By CAD. 

There is no suspicious group of microcalcifications or new suspicious mass in either breast. 





Overall Assessment: Negative, BI-RAD 1





Management: 

Screening Mammogram of both breasts in 1 year.

A clinical breast exam by your physician is recommended on an annual basis and results should be

correlated with mammographic findings.



Electronically signed and approved by: Kavin Humphries D.O.

## 2024-09-13 ENCOUNTER — HOSPITAL ENCOUNTER (OUTPATIENT)
Dept: HOSPITAL 47 - RADMAMWWP | Age: 49
Discharge: HOME | End: 2024-09-13
Attending: OBSTETRICS & GYNECOLOGY
Payer: COMMERCIAL

## 2024-09-13 PROCEDURE — 77063 BREAST TOMOSYNTHESIS BI: CPT

## 2024-09-13 PROCEDURE — 77067 SCR MAMMO BI INCL CAD: CPT

## 2024-09-17 NOTE — MM
Reason for Exam: Screening  (asymptomatic). 

Last mammogram was performed 1 year(s) and 5 month(s) ago. 





Patient History: 

Menarche at age 13. First Full-Term Pregnancy at age 20. Premenopausal. Currently using Hormonal

Contraceptives, for 19 years.

Paternal grandmother had breast cancer, age 50. Maternal aunt had breast cancer, age 50. Maternal

grandmother had breast cancer. Maternal aunt had breast cancer, age 50. 





Risk Values: 

Rosina 5 year model risk: 0.6%.

NCI Lifetime model risk: 6.8%.





Prior Study Comparison: 

6/18/2020 Bilateral Screening Mammogram, [facility]. 2/9/2022 Bilateral Screening Mammogram, [facility]. 4/3/2023

Bilateral MG 3D screening mammo w/cad, [facility]. 





Tissue Density: 

The breasts are heterogeneously dense, which may obscure small masses.





Findings: 

Analyzed By CAD. 

The pattern is symmetrical.

No significant interval change

No suspicious groups of microcalcifications, spiculated or lobular masses, architectural distortion

or other secondary signs of malignancy are mammographically apparent. 





Overall Assessment: Benign, BI-RAD 2





Management: 

Screening Mammogram of both breasts in 1 year.

A negative mammogram report should not preclude additional follow up of suspicious palpable

abnormalities.

Patient should continue monthly self breast exam.

A clinical breast exam by your physician is recommended on an annual basis and results should be

correlated with mammographic findings.



Note on Rosina scores and lifetime risk:

1. A Rosina score greater than 3% is considered moderate risk. If this is the case, consider

specialist referral to assess eligibility for a risk reducing agent.

2. If overall lifetime risk for the development of breast cancer is 20% or higher, the patient may

qualify for future screening with alternating mammogram and breast MRI.



[facility], Workstation: [id number], 9/17/2024 3:58 PM.



Electronically signed and approved by: Johnathan Peres D.O. Radiologis

## 2024-10-11 ENCOUNTER — HOSPITAL ENCOUNTER (OUTPATIENT)
Dept: HOSPITAL 47 - LABWHC1 | Age: 49
Discharge: HOME | End: 2024-10-11
Attending: FAMILY MEDICINE
Payer: COMMERCIAL

## 2024-10-11 LAB
ALBUMIN SERPL-MCNC: 4.3 G/DL (ref 3.8–4.9)
ALBUMIN/GLOB SERPL: 1.43 RATIO (ref 1.6–3.17)
ALP SERPL-CCNC: 65 U/L (ref 41–126)
ALT SERPL-CCNC: 27 U/L (ref 8–44)
ANION GAP SERPL CALC-SCNC: 11.6 MMOL/L (ref 4–12)
AST SERPL-CCNC: 21 U/L (ref 13–35)
BASOPHILS # BLD AUTO: 0.04 X 10*3/UL (ref 0–0.1)
BASOPHILS NFR BLD AUTO: 0.4 %
BUN SERPL-SCNC: 15.2 MG/DL (ref 9–27)
BUN/CREAT SERPL: 16.89 RATIO (ref 12–20)
CALCIUM SPEC-MCNC: 9.2 MG/DL (ref 8.7–10.3)
CHLORIDE SERPL-SCNC: 106 MMOL/L (ref 96–109)
CHOLEST SERPL-MCNC: 248 MG/DL (ref 0–200)
CO2 SERPL-SCNC: 23.4 MMOL/L (ref 21.6–31.8)
EOSINOPHIL # BLD AUTO: 0.12 X 10*3/UL (ref 0.04–0.35)
EOSINOPHIL NFR BLD AUTO: 1.2 %
ERYTHROCYTE [DISTWIDTH] IN BLOOD BY AUTOMATED COUNT: 5.09 X 10*6/UL (ref 4.1–5.2)
ERYTHROCYTE [DISTWIDTH] IN BLOOD: 12.6 % (ref 11.5–14.5)
GLOBULIN SER CALC-MCNC: 3 G/DL (ref 1.6–3.3)
GLUCOSE SERPL-MCNC: 82 MG/DL (ref 70–110)
HCT VFR BLD AUTO: 46.7 % (ref 37.2–46.3)
HDLC SERPL-MCNC: 43.2 MG/DL (ref 40–60)
HGB BLD-MCNC: 15.2 G/DL (ref 12–15)
IMM GRANULOCYTES BLD QL AUTO: 0.2 %
LDLC SERPL CALC-MCNC: 180.2 MG/DL (ref 0–131)
LYMPHOCYTES # SPEC AUTO: 2.68 X 10*3/UL (ref 0.9–5)
LYMPHOCYTES NFR SPEC AUTO: 26.7 %
MCH RBC QN AUTO: 29.9 PG (ref 27–32)
MCHC RBC AUTO-ENTMCNC: 32.5 G/DL (ref 32–37)
MCV RBC AUTO: 91.7 FL (ref 80–97)
MONOCYTES # BLD AUTO: 0.45 X 10*3/UL (ref 0.2–1)
MONOCYTES NFR BLD AUTO: 4.5 %
NEUTROPHILS # BLD AUTO: 6.73 X 10*3/UL (ref 1.8–7.7)
NEUTROPHILS NFR BLD AUTO: 67 %
NRBC BLD AUTO-RTO: 0 X 10*3/UL (ref 0–0.01)
PLATELET # BLD AUTO: 390 X 10*3/UL (ref 140–440)
POTASSIUM SERPL-SCNC: 4.3 MMOL/L (ref 3.5–5.5)
PROT SERPL-MCNC: 7.3 G/DL (ref 6.2–8.2)
SODIUM SERPL-SCNC: 141 MMOL/L (ref 135–145)
T4 FREE SERPL-MCNC: 1.14 NG/DL (ref 0.8–1.8)
TRIGL SERPL-MCNC: 123 MG/DL (ref 0–149)
VLDLC SERPL CALC-MCNC: 24.6 MG/DL (ref 5–40)
WBC # BLD AUTO: 10.04 X 10*3/UL (ref 4.5–10)

## 2024-10-11 PROCEDURE — 36415 COLL VENOUS BLD VENIPUNCTURE: CPT

## 2024-10-11 PROCEDURE — 83036 HEMOGLOBIN GLYCOSYLATED A1C: CPT

## 2024-10-11 PROCEDURE — 85379 FIBRIN DEGRADATION QUANT: CPT

## 2024-10-11 PROCEDURE — 82306 VITAMIN D 25 HYDROXY: CPT

## 2024-10-11 PROCEDURE — 87086 URINE CULTURE/COLONY COUNT: CPT

## 2024-10-11 PROCEDURE — 80061 LIPID PANEL: CPT

## 2024-10-11 PROCEDURE — 85025 COMPLETE CBC W/AUTO DIFF WBC: CPT

## 2024-10-11 PROCEDURE — 84443 ASSAY THYROID STIM HORMONE: CPT

## 2024-10-11 PROCEDURE — 80053 COMPREHEN METABOLIC PANEL: CPT

## 2024-10-11 PROCEDURE — 84439 ASSAY OF FREE THYROXINE: CPT
